# Patient Record
Sex: MALE | Race: WHITE | URBAN - METROPOLITAN AREA
[De-identification: names, ages, dates, MRNs, and addresses within clinical notes are randomized per-mention and may not be internally consistent; named-entity substitution may affect disease eponyms.]

---

## 2018-12-12 ENCOUNTER — AMBULATORY - HEALTHEAST (OUTPATIENT)
Dept: ADMINISTRATIVE | Facility: CLINIC | Age: 78
End: 2018-12-12

## 2018-12-12 ENCOUNTER — RECORDS - HEALTHEAST (OUTPATIENT)
Dept: ADMINISTRATIVE | Facility: OTHER | Age: 78
End: 2018-12-12

## 2018-12-12 DIAGNOSIS — D21.9 FIBROMA: ICD-10-CM

## 2019-01-03 ENCOUNTER — OFFICE VISIT - HEALTHEAST (OUTPATIENT)
Dept: PODIATRY | Facility: CLINIC | Age: 79
End: 2019-01-03

## 2019-01-03 DIAGNOSIS — S90.852A FOREIGN BODY IN LEFT FOOT, INITIAL ENCOUNTER: ICD-10-CM

## 2019-01-03 ASSESSMENT — MIFFLIN-ST. JEOR: SCORE: 1566.77

## 2019-04-09 ENCOUNTER — TELEPHONE (OUTPATIENT)
Dept: DERMATOLOGY | Facility: CLINIC | Age: 79
End: 2019-04-09

## 2019-04-09 NOTE — TELEPHONE ENCOUNTER
[facility] Call Center    Phone Message    May a detailed message be left on voicemail: yes    Reason for Call: Other: Pt is needing to set up an appt for Derm surg for confirmed skin cancer that was done by Dr. Quintero from Main Derm clinic. Please call the pt, Thank you     Action Taken: Other: Dermatology

## 2019-04-17 ENCOUNTER — OFFICE VISIT (OUTPATIENT)
Dept: DERMATOLOGY | Facility: CLINIC | Age: 79
End: 2019-04-17
Payer: COMMERCIAL

## 2019-04-17 DIAGNOSIS — D22.9 MULTIPLE BENIGN NEVI: ICD-10-CM

## 2019-04-17 DIAGNOSIS — L82.1 SEBORRHEIC KERATOSIS: ICD-10-CM

## 2019-04-17 DIAGNOSIS — D18.01 CHERRY ANGIOMA: ICD-10-CM

## 2019-04-17 DIAGNOSIS — Z12.83 SKIN CANCER SCREENING: Primary | ICD-10-CM

## 2019-04-17 DIAGNOSIS — D48.5 NEOPLASM OF UNCERTAIN BEHAVIOR OF SKIN: ICD-10-CM

## 2019-04-17 DIAGNOSIS — Z85.828 HISTORY OF SKIN CANCER: ICD-10-CM

## 2019-04-17 RX ORDER — VENLAFAXINE 100 MG/1
100 TABLET ORAL 2 TIMES DAILY
COMMUNITY

## 2019-04-17 RX ORDER — DORZOLAMIDE HYDROCHLORIDE AND TIMOLOL MALEATE 20; 5 MG/ML; MG/ML
1 SOLUTION/ DROPS OPHTHALMIC
COMMUNITY
Start: 2017-03-13

## 2019-04-17 RX ORDER — ACETAMINOPHEN 500 MG
1000 TABLET ORAL
COMMUNITY
Start: 2017-03-13

## 2019-04-17 RX ORDER — TRAZODONE HYDROCHLORIDE 50 MG/1
50 TABLET, FILM COATED ORAL AT BEDTIME
COMMUNITY

## 2019-04-17 ASSESSMENT — PAIN SCALES - GENERAL
PAINLEVEL: NO PAIN (0)
PAINLEVEL: NO PAIN (0)

## 2019-04-17 NOTE — LETTER
4/17/2019       RE: Jose David Barreto  [address]  [facility] [address]     Dear Colleague,    Thank you for referring your patient, Jose David Barreto, to the [facility] DERMATOLOGY at [facility]. Please see a copy of my visit note below.    [facility] Dermatology Note      Dermatology Problem List:  0. NUB, left wrist- s/p bx 4/17/19  1. Hx NMSC, per pt report  2. Skin cancer screening, 4/17/19    Encounter Date: Apr 17, 2019    CC:  Chief Complaint   Patient presents with     Skin Check     Skin check, one lesion of concern noted on arm.      History of Present Illness:  Mr. Jose David Barreto is a 78 year old male who presents today in referral from Dr. Claude Dupont for a skin check. He reports a skin cancer on his abdomen, which was removed about 10 years ago. He is unsure the type of skin cancer, likely NMSC.    Today he reports a spot of concern on his left forearm. He has had this spot over the last few years, the spot occasionally changing color and fluctuating in size. Denies the lesion being sore or symptomatic to him.     Otherwise he is feeling well, without additional skin concerns.    Past Medical History:   There is no problem list on file for this patient.    No past medical history on file.  No past surgical history on file.    Social History:  Admits to frequent sun exposure over his lifetime. Patient enjoyed many hobbies outside- he enjoyed boating often and jet skiing. Owns a cat.    Family History:  No family history on file.   Both his mother and father had various types of skin cancer (per pt report, he is unsure what types of skin cancer)    Medications:  Current Outpatient Medications   Medication Sig Dispense Refill     acetaminophen (TYLENOL) 500 MG tablet Take 1,000 mg by mouth       Calcium Citrate 200 MG TABS Take 950 mg by mouth       dorzolamide-timolol (COSOPT) 2-0.5 % ophthalmic solution 1 drop       Latanoprost  (XALATAN OP) 1 drop       traZODone (DESYREL) 50 MG tablet Take 50 mg by mouth At Bedtime       venlafaxine (EFFEXOR) 100 MG tablet Take 100 mg by mouth 2 times daily       Cyanocobalamin (B-12) 1000 MCG CAPS TK 1 T PO D  2     vitamin D3 (CHOLECALCIFEROL) 1000 units (25 mcg) tablet TK 1 T PO ONCE DAILY  11       No Known Allergies    Review of Systems:  -Skin/Heme New Pt: The patient admits to frequent sun exposure. The patient denies excessive scarring or problems healing except as per HPI. The patient denies excessive bleeding. Skin as per HPI. No additional skin concerns.  -Constitutional: Otherwise feeling well today, in usual state of health.    Physical exam:  Vitals: There were no vitals taken for this visit.  GEN: This is a well developed, well-nourished male in no acute distress, in a pleasant mood.    SKIN: Total skin excluding the undergarment areas was performed. The exam included the head/face, neck, both arms, chest, back, abdomen, both legs, digits and/or nails.   -There are dome shaped bright red papules on the trunk.   - There are waxy stuck on tan to brown papules on the trunk and extremities.  -There is a 1 cm thin pink scaly plaque on the left wrist  -There are scattered round brown, uniform symmetric macules and papules on the trunk and extremities  -No other lesions of concern on areas examined.       Impression/Plan:  1. Hx NMSC per pt history    No evidence of recurrence. No further intervention required.     Recommend regular skin checks     2. Neoplasm of uncertain behavior on the left wrist. Differential diagnosis to include bcc vs isk vs other  Shave biopsy: After discussion of benefits and risks including but not limited to bleeding, infection, scar, incomplete removal, recurrence, and non-diagnostic biopsy, written consent and photographs were obtained. The area was cleaned with isopropyl alcohol. 1.0 mL of 1% lidocaine with epinephrine was injected to obtain adequate anesthesia of the  lesion on the left wrist. A shave biopsy was performed. Hemostasis was achieved with aluminium chloride. Vaseline and a sterile dressing were applied. The patient tolerated the procedure and no complications were noted. The patient was provided with verbal and written post care instructions.     3. Cherry angiomas, trunk    Reassured of benign, congenital nature     4. Seborrheic keratosis, non irritated    Reassured of benign nature. No further intervention required.   5. Multiple pigmented nevi, benign appearing.    Sun precaution was advised including the use of sun screens of SPF 30 or higher, sun protective clothing, and avoidance of tanning beds. ABCDES reviewed.     CC Claude Dupont  [facility] INTERNAL MEDICINE  [address] [place] [address] on close of this encounter.  Follow-up in 1 year, earlier pending biopsy results or for new or changing lesions.     Staff Involved:  Scribe/Staff    Scribe Disclosure:   Donna DUNN, am serving as a scribe to document services personally performed by Tavia Marie PA-C, based on data collection and the provider's statements to me.    Provider Disclosure:   The documentation recorded by the scribe accurately reflects the services I personally performed and the decisions made by me.    All risks, benefits and alternatives were discussed with patient.  Patient is in agreement and understands the assessment and plan.  All questions were answered.  Sun Screen Education was given.   Return to Clinic in 1 yr or sooner as needed.   Tavia Marie PA-C   [facility] Dermatology [facility]

## 2019-04-17 NOTE — NURSING NOTE
Dermatology Rooming Note    Jose David Barreto's goals for this visit include:   Chief Complaint   Patient presents with     Skin Check     Skin check, one lesion of concern noted on arm.      Kailyn Wells LPN

## 2019-04-17 NOTE — NURSING NOTE
Lidocaine-epinephrine 1-1:010160 % injection   1mL once for one use, starting 4/17/2019 ending 4/17/2019,  2mL disp, R-0, injection  Injected by Kailyn Wells LPN

## 2019-04-17 NOTE — PATIENT INSTRUCTIONS

## 2019-04-17 NOTE — PROGRESS NOTES
[facility] Dermatology Note      Dermatology Problem List:  0. NUB, left wrist- s/p bx 4/17/19  1. Hx NMSC, per pt report  2. Skin cancer screening, 4/17/19    Encounter Date: Apr 17, 2019    CC:  Chief Complaint   Patient presents with     Skin Check     Skin check, one lesion of concern noted on arm.      History of Present Illness:  Mr. Jose David Barreto is a 78 year old male who presents today in referral from Dr. Claude Dupont for a skin check. He reports a skin cancer on his abdomen, which was removed about 10 years ago. He is unsure the type of skin cancer, likely NMSC.    Today he reports a spot of concern on his left forearm. He has had this spot over the last few years, the spot occasionally changing color and fluctuating in size. Denies the lesion being sore or symptomatic to him.     Otherwise he is feeling well, without additional skin concerns.    Past Medical History:   There is no problem list on file for this patient.    No past medical history on file.  No past surgical history on file.    Social History:  Admits to frequent sun exposure over his lifetime. Patient enjoyed many hobbies outside- he enjoyed boating often and jet skiing. Owns a cat.    Family History:  No family history on file.   Both his mother and father had various types of skin cancer (per pt report, he is unsure what types of skin cancer)    Medications:  Current Outpatient Medications   Medication Sig Dispense Refill     acetaminophen (TYLENOL) 500 MG tablet Take 1,000 mg by mouth       Calcium Citrate 200 MG TABS Take 950 mg by mouth       dorzolamide-timolol (COSOPT) 2-0.5 % ophthalmic solution 1 drop       Latanoprost (XALATAN OP) 1 drop       traZODone (DESYREL) 50 MG tablet Take 50 mg by mouth At Bedtime       venlafaxine (EFFEXOR) 100 MG tablet Take 100 mg by mouth 2 times daily       Cyanocobalamin (B-12) 1000 MCG CAPS TK 1 T PO D  2     vitamin D3 (CHOLECALCIFEROL) 1000 units (25 mcg) tablet TK 1 T  PO ONCE DAILY  11       No Known Allergies    Review of Systems:  -Skin/Heme New Pt: The patient admits to frequent sun exposure. The patient denies excessive scarring or problems healing except as per HPI. The patient denies excessive bleeding. Skin as per HPI. No additional skin concerns.  -Constitutional: Otherwise feeling well today, in usual state of health.    Physical exam:  Vitals: There were no vitals taken for this visit.  GEN: This is a well developed, well-nourished male in no acute distress, in a pleasant mood.    SKIN: Total skin excluding the undergarment areas was performed. The exam included the head/face, neck, both arms, chest, back, abdomen, both legs, digits and/or nails.   -There are dome shaped bright red papules on the trunk.   - There are waxy stuck on tan to brown papules on the trunk and extremities.  -There is a 1 cm thin pink scaly plaque on the left wrist  -There are scattered round brown, uniform symmetric macules and papules on the trunk and extremities  -No other lesions of concern on areas examined.       Impression/Plan:  1. Hx NMSC per pt history    No evidence of recurrence. No further intervention required.     Recommend regular skin checks     2. Neoplasm of uncertain behavior on the left wrist. Differential diagnosis to include bcc vs isk vs other  Shave biopsy: After discussion of benefits and risks including but not limited to bleeding, infection, scar, incomplete removal, recurrence, and non-diagnostic biopsy, written consent and photographs were obtained. The area was cleaned with isopropyl alcohol. 1.0 mL of 1% lidocaine with epinephrine was injected to obtain adequate anesthesia of the lesion on the left wrist. A shave biopsy was performed. Hemostasis was achieved with aluminium chloride. Vaseline and a sterile dressing were applied. The patient tolerated the procedure and no complications were noted. The patient was provided with verbal and written post care  instructions.     3. Cherry angiomas, trunk    Reassured of benign, congenital nature     4. Seborrheic keratosis, non irritated    Reassured of benign nature. No further intervention required.   5. Multiple pigmented nevi, benign appearing.    Sun precaution was advised including the use of sun screens of SPF 30 or higher, sun protective clothing, and avoidance of tanning beds. ABCDES reviewed.     CC Claude Dupont  [facility] INTERNAL MEDICINE  [address] [place] [address] on close of this encounter.  Follow-up in 1 year, earlier pending biopsy results or for new or changing lesions.     Staff Involved:  Scribe/Staff    Scribe Disclosure:   Donna DUNN, am serving as a scribe to document services personally performed by Tavia Marie PA-C, based on data collection and the provider's statements to me.    Provider Disclosure:   The documentation recorded by the scribe accurately reflects the services I personally performed and the decisions made by me.    All risks, benefits and alternatives were discussed with patient.  Patient is in agreement and understands the assessment and plan.  All questions were answered.  Sun Screen Education was given.   Return to Clinic in 1 yr or sooner as needed.   Tavia Marie PA-C   [facility] Dermatology Clinic

## 2019-04-22 LAB — COPATH REPORT: NORMAL

## 2019-05-29 ENCOUNTER — OFFICE VISIT (OUTPATIENT)
Dept: DERMATOLOGY | Facility: CLINIC | Age: 79
End: 2019-05-29
Payer: COMMERCIAL

## 2019-05-29 DIAGNOSIS — D04.62: Primary | ICD-10-CM

## 2019-05-29 ASSESSMENT — PAIN SCALES - GENERAL: PAINLEVEL: NO PAIN (0)

## 2019-05-29 NOTE — LETTER
5/29/2019       RE: Jose David Barreto  [address]  [facility] [address]     Dear Colleague,    Thank you for referring your patient, Jose David Barreto, to the [facility] DERMATOLOGY at [facility]. Please see a copy of my visit note below.    Dermatology Procedure Note: Electrodesiccation and Curettage    PREOPERATIVE DIAGNOSIS: Squamous cell carcinoma, in situ    POSTOPERATIVE DIAGNOSIS: Same    LOCATION: Left wrist     SIZE:  1.4 x 0.9 cm     Treatment options including electrodessiccation and curettage (ED and C), excision and topicals were reviewed.  The expected cure rates, healing times and anticipated scars of each option were discussed and the patient elects to proceed with ED and C.     The risks and benefits of the procedure were described to the patient.  These include but are not limited to bleeding, infection, scar, incomplete removal, and recurrence. Written informed consent was obtained. Time-out was performed. The above site was cleansed with and injected with 2.5 mL 1% lidocaine with epinephrine. Once anesthesia was obtained, the site was prepped with Chlorhexidine and rinsed with sterile saline. The lesion was curetted with  in 3 directions with a 2 mm margin and this was followed by electrodessication.  This process was repeated three times. The defect measured 1.6 cm x 1.1 cm. Vaseline and a bandage were applied to the wound. The patient tolerated the procedure well and was given post care instructions.    Follow up in 3 months, earlier for new or worsening symptoms    Staff Involved:  Scribe/Staff    Scribe Disclosure:   I, Donna Patel, am serving as a scribe to document services personally performed by Tavia Marie PA-C, based on data collection and the provider's statements to me.    Provider Disclosure:   The documentation recorded by the scribe accurately reflects the services I personally performed and the decisions made by me.    All risks,  benefits and alternatives were discussed with patient.  Patient is in agreement and understands the assessment and plan.  All questions were answered.  Sun Screen Education was given.   Return to Clinic in 3 months or sooner as needed.   Tavia Marie PA-C   [facility]erm[facility]              Again, thank you for allowing me to participate in the care of your patient.      Sincerely,    Tavia Marie PA-C

## 2019-05-29 NOTE — LETTER
Date:May 30, 2019      Patient was self referred, no letter generated. Do not send.        [facility] Health Information

## 2019-05-29 NOTE — PROGRESS NOTES
Dermatology Procedure Note: Electrodesiccation and Curettage    PREOPERATIVE DIAGNOSIS: Squamous cell carcinoma, in situ    POSTOPERATIVE DIAGNOSIS: Same    LOCATION: Left wrist     SIZE:  1.4 x 0.9 cm     Treatment options including electrodessiccation and curettage (ED and C), excision and topicals were reviewed.  The expected cure rates, healing times and anticipated scars of each option were discussed and the patient elects to proceed with ED and C.     The risks and benefits of the procedure were described to the patient.  These include but are not limited to bleeding, infection, scar, incomplete removal, and recurrence. Written informed consent was obtained. Time-out was performed. The above site was cleansed with and injected with 2.5 mL 1% lidocaine with epinephrine. Once anesthesia was obtained, the site was prepped with Chlorhexidine and rinsed with sterile saline. The lesion was curetted with  in 3 directions with a 2 mm margin and this was followed by electrodessication.  This process was repeated three times. The defect measured 1.6 cm x 1.1 cm. Vaseline and a bandage were applied to the wound. The patient tolerated the procedure well and was given post care instructions.    Follow up in 3 months, earlier for new or worsening symptoms    Staff Involved:  Scribe/Staff    Scribe Disclosure:   SHAUN, Donna Patel, am serving as a scribe to document services personally performed by Tavia Marie PA-C, based on data collection and the provider's statements to me.    Provider Disclosure:   The documentation recorded by the scribe accurately reflects the services I personally performed and the decisions made by me.    All risks, benefits and alternatives were discussed with patient.  Patient is in agreement and understands the assessment and plan.  All questions were answered.  Sun Screen Education was given.   Return to Clinic in 3 months or sooner as needed.   Tavia Marie PA-C   [facility]  Dermatology Clinic

## 2019-05-29 NOTE — NURSING NOTE
Dermatology Rooming Note    Jose David Barreto's goals for this visit include:   Chief Complaint   Patient presents with     Derm Problem     Jose David is here to remove spot on left forearm     Nasreen Hayes, CMA

## 2019-05-29 NOTE — PATIENT INSTRUCTIONS
Wound Care:  Electrodesiccation and Curettage     I will experience scar, altered skin color, bleeding, swelling, pain, crusting and redness. I may experience altered sensation. Risks are excessive bleeding, infection, muscle weakness, thick (hypertrophic or keloidal) scar, and recurrence,. A second procedure may be recommended to obtain the best cosmetic or functional result.    What is electrodesiccation and curettage ?    Scraping off tissue (curettage)    Destroy tissue using electric current or cautery (electrodessication)    How do I perform wound care?    Keep dressing in place for two days. You may shower with the dressing in place(do not get wet)    After 2 days, wash hands and remove dressing. Clean wound with cotton-swab soaked in hydrogen peroxide to remove drainage and crust    Put on a thick layer of Vaseline on the wound using a cotton-swab     Cover the wound with a Band-AidTM to protect from dust and tight clothing    If wound is draining before two days, change your dressing as described above sooner    During wound care, do not allow the area to dry out or form a scab    What do I need?    Hydrogen peroxide     Cotton-swabs     Vaseline or petroleum jelly     Band-AidsTM or dressing supplies as needed     When should I call the doctor?  [facility]: [phone]  [facility]: [phone]  For urgent needs outside of business hours call the [facility] at [phone] and ask for the dermatology resident on call

## 2019-07-02 ENCOUNTER — DOCUMENTATION ONLY (OUTPATIENT)
Dept: CARE COORDINATION | Facility: CLINIC | Age: 79
End: 2019-07-02

## 2019-08-30 ENCOUNTER — OFFICE VISIT (OUTPATIENT)
Dept: DERMATOLOGY | Facility: CLINIC | Age: 79
End: 2019-08-30
Payer: COMMERCIAL

## 2019-08-30 DIAGNOSIS — Z12.83 SKIN CANCER SCREENING: Primary | ICD-10-CM

## 2019-08-30 DIAGNOSIS — Z86.007 HISTORY OF SQUAMOUS CELL CARCINOMA IN SITU: ICD-10-CM

## 2019-08-30 DIAGNOSIS — D18.01 CHERRY ANGIOMA: ICD-10-CM

## 2019-08-30 DIAGNOSIS — L82.1 SEBORRHEIC KERATOSIS: ICD-10-CM

## 2019-08-30 ASSESSMENT — PAIN SCALES - GENERAL: PAINLEVEL: NO PAIN (0)

## 2019-08-30 NOTE — LETTER
8/30/2019       RE: Jose David Barreto  [address]  [facility] [address]     Dear Colleague,    Thank you for referring your patient, Jose David Barreto, to the [facility] DERMATOLOGY at [facility]. Please see a copy of my visit note below.    [facility] Dermatology Note      Dermatology Problem List:  1. 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 Skin cancer screening 8/30/19    Encounter Date: Aug 30, 2019    CC:  Chief Complaint   Patient presents with     Skin Check     Skin check.         History of Present Illness:  Mr. Jose David Barreto is a 79 year old male with history of non-melanoma skin cancer who presents today for a skin exam. 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 Today he reports that this site has healed well. He did not have any problems with it healing. He reports a lesion on his left underarm. This is asymptomatic. He just wants to make sure it is benign. He denies any bleeding, painful, or growing lesions. He is currently recovering from cataract surgery. The patient is otherwise feeling well. There are no other skin concerns at this time.      Past Medical History:   There is no problem list on file for this patient.    No past medical history on file.  No past surgical history on file.    Social History:  Patient reports that he has quit smoking. He has never used smokeless tobacco.    Family History:  No family history on file.    Medications:  Current Outpatient Medications   Medication Sig Dispense Refill     acetaminophen (TYLENOL) 500 MG tablet Take 1,000 mg by mouth       Calcium Citrate 200 MG TABS Take 950 mg by mouth       Cyanocobalamin (B-12) 1000 MCG CAPS TK 1 T PO D  2     dorzolamide-timolol (COSOPT) 2-0.5 % ophthalmic solution 1 drop       Latanoprost (XALATAN OP) 1 drop       traZODone (DESYREL) 50 MG tablet Take 50 mg by  mouth At Bedtime       venlafaxine (EFFEXOR) 100 MG tablet Take 100 mg by mouth 2 times daily       vitamin D3 (CHOLECALCIFEROL) 1000 units (25 mcg) tablet TK 1 T PO ONCE DAILY  11       No Known Allergies    Review of Systems:  -Skin Establ Pt: The patient denies any new rash, pruritus, or lesions that are symptomatic, changing or bleeding, except as per HPI.  -Constitutional: The patient is feeling generally well.    Physical exam:  Vitals: There were no vitals taken for this visit.  GEN: This is a well developed, well-nourished male in no acute distress, in a pleasant mood.    SKIN: Total skin excluding the undergarment areas was performed. The exam included the head/face, neck, both arms, chest, back, abdomen, both legs, digits and/or nails.   - Light pink patch on the left wrist without scale or nodularity.   - Dome shaped bright red papules on the trunk.   -  Waxy stuck on tan to brown papules on the trunk and extremities.  - Scattered round brown, uniform symmetric macules and papules on the trunk and extremities  - No other lesions of concern on areas examined.      Impression/Plan:  1. History of non-melanoma skin cancer, most recently 5/29/19    No clinical evidence of recurrence.    Continue periodic skin exams and report of new or changing lesions.    Recommend sunscreens SPF #30 or greater, protective clothing and avoidance of tanning beds.    2. Multiple clinically benign nevi - trunk and extremities     ABCDs of melanoma were discussed and self skin checks were advised.     3. Seborrheic keratosis - trunk and extremities     Benign nature reassured. No further intervention required.     4. Cherry angiomas - trunk     Benign nature reassured. No further intervention required.     Follow-up annually earlier for new or changing lesions.       Staff Involved:  Staff Only    Scribe Disclosure:  Fuad DUNN, am serving as a scribe to document services personally performed by Tavia Marie  FLOYD, based on data collection and the provider's statements to me.     Provider Disclosure:   The documentation recorded by the scribe accurately reflects the services I personally performed and the decisions made by me.    All risks, benefits and alternatives were discussed with patient.  Patient is in agreement and understands the assessment and plan.  All questions were answered.  Sun Screen Education was given.   Return to Clinic annually or sooner as needed.   Tavia Marie PA-C   [facility] Dermatology [facility]           Again, thank you for allowing me to participate in the care of your patient.      Sincerely,    Tavia Marie PA-C

## 2019-08-30 NOTE — PROGRESS NOTES
[facility] D[facility]atology Note      Dermatology Problem List:  1. 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 Skin cancer screening 8/30/19    Encounter Date: Aug 30, 2019    CC:  Chief Complaint   Patient presents with     Skin Check     Skin check.         History of Present Illness:  Mr. Jose David Barreto is a 79 year old male with history of non-melanoma skin cancer who presents today for a skin exam. 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 Today he reports that this site has healed well. He did not have any problems with it healing. He reports a lesion on his left underarm. This is asymptomatic. He just wants to make sure it is benign. He denies any bleeding, painful, or growing lesions. He is currently recovering from cataract surgery. The patient is otherwise feeling well. There are no other skin concerns at this time.      Past Medical History:   There is no problem list on file for this patient.    No past medical history on file.  No past surgical history on file.    Social History:  Patient reports that he has quit smoking. He has never used smokeless tobacco.    Family History:  No family history on file.    Medications:  Current Outpatient Medications   Medication Sig Dispense Refill     acetaminophen (TYLENOL) 500 MG tablet Take 1,000 mg by mouth       Calcium Citrate 200 MG TABS Take 950 mg by mouth       Cyanocobalamin (B-12) 1000 MCG CAPS TK 1 T PO D  2     dorzolamide-timolol (COSOPT) 2-0.5 % ophthalmic solution 1 drop       Latanoprost (XALATAN OP) 1 drop       traZODone (DESYREL) 50 MG tablet Take 50 mg by mouth At Bedtime       venlafaxine (EFFEXOR) 100 MG tablet Take 100 mg by mouth 2 times daily       vitamin D3 (CHOLECALCIFEROL) 1000 units (25 mcg) tablet TK 1 T PO ONCE DAILY  11       No Known Allergies    Review of Systems:  -Skin Establ Pt: The patient denies any new rash, pruritus, or  lesions that are symptomatic, changing or bleeding, except as per HPI.  -Constitutional: The patient is feeling generally well.    Physical exam:  Vitals: There were no vitals taken for this visit.  GEN: This is a well developed, well-nourished male in no acute distress, in a pleasant mood.    SKIN: Total skin excluding the undergarment areas was performed. The exam included the head/face, neck, both arms, chest, back, abdomen, both legs, digits and/or nails.   - Light pink patch on the left wrist without scale or nodularity.   - Dome shaped bright red papules on the trunk.   - Waxy stuck on tan to brown papules on the trunk and extremities.  - Scattered round brown, uniform symmetric macules and papules on the trunk and extremities  - No other lesions of concern on areas examined.        Impression/Plan:  1. History of non-melanoma skin cancer, most recently 5/29/19    No clinical evidence of recurrence.    Continue periodic skin exams and report of new or changing lesions.    Recommend sunscreens SPF #30 or greater, protective clothing and avoidance of tanning beds.    2. Multiple clinically benign nevi - trunk and extremities     ABCDs of melanoma were discussed and self skin checks were advised.     3. Seborrheic keratosis - trunk and extremities     Benign nature reassured. No further intervention required.     4. Cherry angiomas - trunk     Benign nature reassured. No further intervention required.     Follow-up annually earlier for new or changing lesions.       Staff Involved:  Staff Only    Scribe Disclosure:  SHAUN, Fuad Crenshaw, am serving as a scribe to document services personally performed by Tavia Marie PA-C, based on data collection and the provider's statements to me.     Provider Disclosure:   The documentation recorded by the scribe accurately reflects the services I personally performed and the decisions made by me.    All risks, benefits and alternatives were discussed with  patient.  Patient is in agreement and understands the assessment and plan.  All questions were answered.  Sun Screen Education was given.   Return to Clinic annually or sooner as needed.   Tavia Marie PA-C   [facility]

## 2019-08-30 NOTE — NURSING NOTE
Dermatology Rooming Note    Jose David Barreto's goals for this visit include:   Chief Complaint   Patient presents with     Skin Check     Skin check.     Kailyn Wells LPN

## 2021-06-02 VITALS — WEIGHT: 185.3 LBS | BODY MASS INDEX: 26.53 KG/M2 | HEIGHT: 70 IN

## 2021-06-22 NOTE — PROGRESS NOTES
Admission History & Physical  Jose David Barreto, 1940, [id number]    [facility]  Claude Dupont MD, [phone]    Extended Emergency Contact Information  Primary Emergency Contact: ELA OSUNA  Home Phone: [phone]  Relation: Spouse  Secondary Emergency Contact: DECLINED,DECLINE  Relation: Declined     Assessment and Plan:   Assessment: Foreign body left foot  Plan: Remove the foreign body left foot.  The patient is to return to the clinic as needed  Active Problems:    * No active hospital problems. *      Chief Complaint:  Left foot pain     HPI:    Jose David Barreto is a 78 y.o. old male the patient presented to the clinic today complaining of pain in the bottom of his left foot.  He has had this pain for approximately 30 days.  He describes the pain as a sharp pain with weightbearing and ambulation.  He does not recall any trauma to the left foot.  He stated that it feels like he has a painful wart.  He denies any other previous treatment.  The pain is completely resolved with nonweightbearing.  He has not noticed any redness, drainage or swelling.  History is provided by patient    Medical History  There are no active non-hospital problems to display for this patient.    History reviewed. No pertinent past medical history.  There are no active problems to display for this patient.    Surgical History  He  has no past surgical history on file.   History reviewed. No pertinent surgical history. Social History  Reviewed, and he  reports that he has quit smoking. he has never used smokeless tobacco.  Social History     Tobacco Use     Smoking status: Former Smoker     Smokeless tobacco: Never Used   Substance Use Topics     Alcohol use: Not on file      Allergies  No Known Allergies Family History  Reviewed, and family history is not on file.   Psychosocial Needs  Social History     Social History Narrative     Not on file     Additional psychosocial needs reviewed per nursing  "assessment.       Prior to Admission Medications     (Not in a hospital admission)        Review of Systems - Negative     /80 (Patient Site: Left Arm, Patient Position: Sitting, Cuff Size: Adult Regular)   Pulse 64   Ht 5' 10\" (1.778 m)   Wt 185 lb 4.8 oz (84.1 kg)   BMI 26.59 kg/m      Objective findings: General: The patient is alert and in no acute distress     Integument: Nails bilateral feet are normal length and color.  Skin bilaterally warm supple and intact.  Small hyperkeratotic lesion sub-fourth metatarsal head left foot.  There is no drainage or bleeding noted.  There is pain on palpation of this area.       Vascular: DP and PT pulses +2/4 bilateral feet.  Capillary refill less than 2 seconds bilaterally.     Neurologic: Negative clonus, negative Babinski bilateral feet.     Musculoskeletal: Range of motion within normal limits bilateral feet.  Muscle power +4/5 bilaterally within all compartments.      Assessment: Foreign body left foot     Plan: I informed the patient that it appears he has a foreign body.  The foreign body was surgically removed.  It was a splinter of wood.  I informed the patient that there was no drainage of bleeding following the removal of the foreign body.  I informed the patient that no follow-up treatment is needed.  He is to return to the clinic as needed.  "